# Patient Record
Sex: MALE | Race: BLACK OR AFRICAN AMERICAN | NOT HISPANIC OR LATINO
[De-identification: names, ages, dates, MRNs, and addresses within clinical notes are randomized per-mention and may not be internally consistent; named-entity substitution may affect disease eponyms.]

---

## 2018-12-05 ENCOUNTER — APPOINTMENT (OUTPATIENT)
Dept: CT IMAGING | Facility: HOSPITAL | Age: 68
End: 2018-12-05

## 2018-12-05 ENCOUNTER — OUTPATIENT (OUTPATIENT)
Dept: OUTPATIENT SERVICES | Facility: HOSPITAL | Age: 68
LOS: 1 days | Discharge: HOME | End: 2018-12-05
Payer: COMMERCIAL

## 2018-12-05 DIAGNOSIS — R19.00 INTRA-ABDOMINAL AND PELVIC SWELLING, MASS AND LUMP, UNSPECIFIED SITE: ICD-10-CM

## 2018-12-05 DIAGNOSIS — R79.89 OTHER SPECIFIED ABNORMAL FINDINGS OF BLOOD CHEMISTRY: ICD-10-CM

## 2018-12-05 LAB
ANION GAP SERPL CALC-SCNC: 8 MMOL/L — SIGNIFICANT CHANGE UP (ref 5–17)
BUN SERPL-MCNC: 10 MG/DL — SIGNIFICANT CHANGE UP (ref 7–23)
CALCIUM SERPL-MCNC: 8.7 MG/DL — SIGNIFICANT CHANGE UP (ref 8.5–10.1)
CHLORIDE SERPL-SCNC: 109 MMOL/L — HIGH (ref 96–108)
CO2 SERPL-SCNC: 27 MMOL/L — SIGNIFICANT CHANGE UP (ref 22–31)
CREAT SERPL-MCNC: 1.03 MG/DL — SIGNIFICANT CHANGE UP (ref 0.5–1.3)
GLUCOSE SERPL-MCNC: 87 MG/DL — SIGNIFICANT CHANGE UP (ref 70–99)
POTASSIUM SERPL-MCNC: 3.7 MMOL/L — SIGNIFICANT CHANGE UP (ref 3.5–5.3)
POTASSIUM SERPL-SCNC: 3.7 MMOL/L — SIGNIFICANT CHANGE UP (ref 3.5–5.3)
SODIUM SERPL-SCNC: 144 MMOL/L — SIGNIFICANT CHANGE UP (ref 135–145)

## 2018-12-05 PROCEDURE — 74177 CT ABD & PELVIS W/CONTRAST: CPT | Mod: 26

## 2018-12-19 ENCOUNTER — TRANSCRIPTION ENCOUNTER (OUTPATIENT)
Age: 68
End: 2018-12-19

## 2019-01-28 ENCOUNTER — OUTPATIENT (OUTPATIENT)
Dept: OUTPATIENT SERVICES | Facility: HOSPITAL | Age: 69
LOS: 1 days | End: 2019-01-28
Payer: COMMERCIAL

## 2019-01-28 ENCOUNTER — APPOINTMENT (OUTPATIENT)
Dept: MRI IMAGING | Facility: IMAGING CENTER | Age: 69
End: 2019-01-28

## 2019-01-28 DIAGNOSIS — Z00.8 ENCOUNTER FOR OTHER GENERAL EXAMINATION: ICD-10-CM

## 2019-01-28 PROCEDURE — 72197 MRI PELVIS W/O & W/DYE: CPT | Mod: 26

## 2019-01-28 PROCEDURE — 72197 MRI PELVIS W/O & W/DYE: CPT

## 2019-01-28 PROCEDURE — A9585: CPT

## 2019-06-18 ENCOUNTER — RECORD ABSTRACTING (OUTPATIENT)
Age: 69
End: 2019-06-18

## 2019-06-18 DIAGNOSIS — M19.90 UNSPECIFIED OSTEOARTHRITIS, UNSPECIFIED SITE: ICD-10-CM

## 2019-06-18 DIAGNOSIS — Z82.0 FAMILY HISTORY OF EPILEPSY AND OTHER DISEASES OF THE NERVOUS SYSTEM: ICD-10-CM

## 2019-06-18 DIAGNOSIS — Z78.9 OTHER SPECIFIED HEALTH STATUS: ICD-10-CM

## 2019-06-18 DIAGNOSIS — Z87.891 PERSONAL HISTORY OF NICOTINE DEPENDENCE: ICD-10-CM

## 2019-06-18 DIAGNOSIS — Z83.3 FAMILY HISTORY OF DIABETES MELLITUS: ICD-10-CM

## 2019-06-18 DIAGNOSIS — R41.3 OTHER AMNESIA: ICD-10-CM

## 2019-06-27 ENCOUNTER — APPOINTMENT (OUTPATIENT)
Dept: UROLOGY | Facility: CLINIC | Age: 69
End: 2019-06-27

## 2019-07-02 ENCOUNTER — TRANSCRIPTION ENCOUNTER (OUTPATIENT)
Age: 69
End: 2019-07-02

## 2019-07-15 ENCOUNTER — RECORD ABSTRACTING (OUTPATIENT)
Age: 69
End: 2019-07-15

## 2019-07-15 LAB — PSA SERPL-MCNC: 8.5

## 2019-07-23 ENCOUNTER — APPOINTMENT (OUTPATIENT)
Dept: UROLOGY | Facility: CLINIC | Age: 69
End: 2019-07-23
Payer: COMMERCIAL

## 2019-07-23 VITALS
DIASTOLIC BLOOD PRESSURE: 78 MMHG | BODY MASS INDEX: 31.39 KG/M2 | HEART RATE: 68 BPM | SYSTOLIC BLOOD PRESSURE: 129 MMHG | HEIGHT: 67 IN | TEMPERATURE: 98.1 F | OXYGEN SATURATION: 94 % | WEIGHT: 200 LBS | RESPIRATION RATE: 13 BRPM

## 2019-07-23 DIAGNOSIS — Z86.79 PERSONAL HISTORY OF OTHER DISEASES OF THE CIRCULATORY SYSTEM: ICD-10-CM

## 2019-07-23 DIAGNOSIS — J31.0 CHRONIC RHINITIS: ICD-10-CM

## 2019-07-23 DIAGNOSIS — Z87.898 PERSONAL HISTORY OF OTHER SPECIFIED CONDITIONS: ICD-10-CM

## 2019-07-23 PROCEDURE — 99214 OFFICE O/P EST MOD 30 MIN: CPT

## 2019-07-23 RX ORDER — LOSARTAN POTASSIUM 25 MG/1
25 TABLET, FILM COATED ORAL DAILY
Refills: 0 | Status: DISCONTINUED | COMMUNITY
End: 2019-07-23

## 2019-07-23 NOTE — HISTORY OF PRESENT ILLNESS
[FreeTextEntry1] : Pt was diagnosed with CA prostate. here for follow up. PSA is rising. Will repeat MRI

## 2019-07-23 NOTE — PHYSICAL EXAM
[General Appearance - Well Developed] : well developed [General Appearance - Well Nourished] : well nourished [Well Groomed] : well groomed [Normal Appearance] : normal appearance [General Appearance - In No Acute Distress] : no acute distress [Edema] : no peripheral edema [Respiration, Rhythm And Depth] : normal respiratory rhythm and effort [Exaggerated Use Of Accessory Muscles For Inspiration] : no accessory muscle use [Abdomen Tenderness] : non-tender [Abdomen Soft] : soft [Costovertebral Angle Tenderness] : no ~M costovertebral angle tenderness [Urethral Meatus] : meatus normal [Urinary Bladder Findings] : the bladder was normal on palpation [Scrotum] : the scrotum was normal [Testes Mass (___cm)] : there were no testicular masses [No Prostate Nodules] : no prostate nodules [Normal Station and Gait] : the gait and station were normal for the patient's age [] : no rash [No Focal Deficits] : no focal deficits [Affect] : the affect was normal [Oriented To Time, Place, And Person] : oriented to person, place, and time [Not Anxious] : not anxious [Mood] : the mood was normal [No Palpable Adenopathy] : no palpable adenopathy

## 2019-07-23 NOTE — REVIEW OF SYSTEMS
[Painful [place]] : painful [place] [Feeling Tired] : feeling tired [Heartburn] : heartburn [Poor quality erections] : Poor quality erections [Wake up at night to urinate  How many times?  ___] : wakes up to urinate [unfilled] times during the night [Strong urge to urinate] : strong urge to urinate [Strain or push to urinate] : strain or push to urinate [Hot Flashes] : hot flashes [Skin Lesions] : skin lesion [Itching] : itching [Negative] : Heme/Lymph [FreeTextEntry3] : Frequent Urination (4x)

## 2019-07-24 LAB
APPEARANCE: CLEAR
BACTERIA: NEGATIVE
BILIRUBIN URINE: NEGATIVE
BLOOD URINE: NEGATIVE
COLOR: YELLOW
GLUCOSE QUALITATIVE U: NEGATIVE
HYALINE CASTS: 0 /LPF
KETONES URINE: NEGATIVE
LEUKOCYTE ESTERASE URINE: NEGATIVE
MICROSCOPIC-UA: NORMAL
NITRITE URINE: NEGATIVE
PH URINE: 6.5
PROTEIN URINE: NORMAL
RED BLOOD CELLS URINE: 4 /HPF
SPECIFIC GRAVITY URINE: 1.02
SQUAMOUS EPITHELIAL CELLS: 0 /HPF
UROBILINOGEN URINE: NORMAL
WHITE BLOOD CELLS URINE: 2 /HPF

## 2019-07-25 LAB — BACTERIA UR CULT: NORMAL

## 2019-08-29 ENCOUNTER — OUTPATIENT (OUTPATIENT)
Dept: OUTPATIENT SERVICES | Facility: HOSPITAL | Age: 69
LOS: 1 days | End: 2019-08-29
Payer: COMMERCIAL

## 2019-08-29 ENCOUNTER — APPOINTMENT (OUTPATIENT)
Dept: MRI IMAGING | Facility: IMAGING CENTER | Age: 69
End: 2019-08-29
Payer: COMMERCIAL

## 2019-08-29 DIAGNOSIS — C61 MALIGNANT NEOPLASM OF PROSTATE: ICD-10-CM

## 2019-08-29 PROCEDURE — 72197 MRI PELVIS W/O & W/DYE: CPT | Mod: 26

## 2019-08-29 PROCEDURE — 72197 MRI PELVIS W/O & W/DYE: CPT

## 2019-08-29 PROCEDURE — A9585: CPT

## 2019-09-24 ENCOUNTER — APPOINTMENT (OUTPATIENT)
Dept: UROLOGY | Facility: CLINIC | Age: 69
End: 2019-09-24
Payer: COMMERCIAL

## 2019-09-24 PROCEDURE — 99213 OFFICE O/P EST LOW 20 MIN: CPT

## 2019-09-24 NOTE — ASSESSMENT
[FreeTextEntry1] : MRI : no obvious cancer. still blood present from the previous biopsy.\par will follow PSA

## 2019-09-24 NOTE — REVIEW OF SYSTEMS
[Negative] : Heme/Lymph [Feeling Tired] : feeling tired [Heartburn] : heartburn [Painful [place]] : painful [place] [Poor quality erections] : Poor quality erections [Wake up at night to urinate  How many times?  ___] : wakes up to urinate [unfilled] times during the night [Strong urge to urinate] : strong urge to urinate [Strain or push to urinate] : strain or push to urinate [Skin Lesions] : skin lesion [Itching] : itching [Hot Flashes] : hot flashes [FreeTextEntry3] : Frequent Urination (4x)

## 2019-09-24 NOTE — HISTORY OF PRESENT ILLNESS
[FreeTextEntry1] : Enlarged prostate with lower urinary tract symptoms (LUTS) (600.01) (N40.1)\par  · nocturia X 3, urgency. Uroflow has been good\par Adenocarcinoma of prostate (185) (C61)\par  · The patient is a 68 year old male who presents for evaluation and management of\par     prostate cancer. Date first diagnosed: (11/07/2018). The course has been constant.\par     Carlitos score: 8 (4+4= 8 in one core. 3+3= 6 in 3 cores,\par     4+3=7 in 2 coeres). Total 6/12 cores positve. Cell type: adenocarcinoma.\par     \par      PSA results: \par     10/00/2007 1.59 ng/ml\par     03/03/2008 4.310 ng/ml\par     03/07/2008 3.9 ng/ml\par     04/15/2008 3.4 ng/ml\par     05/12/2008 2.8 ng/ml\par     09/15/2008 1.4 ng/ml\par     11/11/2008 1.4 ng/ml\par     05/00/2010 1.8 ng/ml\par     05/00/2011 2.4 ng/ml\par     03/00/2012 2.8 ng/ml\par     06/00/2012 2.5 ng/ml\par     06/2013 2.5 ng/ml\par     10/2013 2.7 ng/ml\par     04/21/2017 5.3 ng/ml\par     02/16/2018 7.2 ng/ml\par     05/18/2018 7.1 ng/ml\par     09/30/2018 9.1 ng/ml\par     11/07/2018 TRUS and NBP .\par      Pathology result.\par     Adenocarcinoma of Prostate Gleasons score 4+3=7\par     \par      CT scan, 12/05/2018 @ [facility].\par     IMPRESSION:\par     1.3 cm hepatic cyst. Multiple additional subcentimeter low attenuating lesions are\par     present in the liver, too small to further characterize.\par     No adenopathy.\par     Indeterminate sclerotic right iliac bone lesion (8 mm).\par     Enlarged prostate gland.\par     \par      MRI result, on 01/28/2019 @ [facility].\par     IMPRESSION:\par     Hemorrhage from recent biopsy limits evaluation. Prostate capsule is intact. Consider\par     repeat examination in 3 months.\par     PIRADS 3 - Intermediate (the presence of clinically significant cancer is equivocal)\par     \par     03/19/2019 8.5 ng/ml\par     PSA : 07/16/2019: 10.5 ng/ml\par     \par     will repeat MRI prostate\par \par Pt was diagnosed with CA prostate. here for follow up. PSA is rising. Will repeat MRI

## 2019-09-24 NOTE — PHYSICAL EXAM
[General Appearance - Well Developed] : well developed [General Appearance - Well Nourished] : well nourished [Normal Appearance] : normal appearance [Well Groomed] : well groomed [General Appearance - In No Acute Distress] : no acute distress [Abdomen Soft] : soft [Abdomen Tenderness] : non-tender [Costovertebral Angle Tenderness] : no ~M costovertebral angle tenderness [Urethral Meatus] : meatus normal [Scrotum] : the scrotum was normal [Urinary Bladder Findings] : the bladder was normal on palpation [No Prostate Nodules] : no prostate nodules [Testes Mass (___cm)] : there were no testicular masses [Edema] : no peripheral edema [] : no respiratory distress [Respiration, Rhythm And Depth] : normal respiratory rhythm and effort [Exaggerated Use Of Accessory Muscles For Inspiration] : no accessory muscle use [Oriented To Time, Place, And Person] : oriented to person, place, and time [Affect] : the affect was normal [Mood] : the mood was normal [Not Anxious] : not anxious [Normal Station and Gait] : the gait and station were normal for the patient's age [No Focal Deficits] : no focal deficits [No Palpable Adenopathy] : no palpable adenopathy

## 2019-12-12 ENCOUNTER — APPOINTMENT (OUTPATIENT)
Dept: UROLOGY | Facility: CLINIC | Age: 69
End: 2019-12-12
Payer: COMMERCIAL

## 2019-12-12 VITALS — TEMPERATURE: 98 F | HEART RATE: 65 BPM | SYSTOLIC BLOOD PRESSURE: 132 MMHG | DIASTOLIC BLOOD PRESSURE: 77 MMHG

## 2019-12-12 PROCEDURE — 99214 OFFICE O/P EST MOD 30 MIN: CPT

## 2019-12-12 NOTE — REVIEW OF SYSTEMS
[Feeling Tired] : feeling tired [Heartburn] : heartburn [Painful [place]] : painful [place] [Poor quality erections] : Poor quality erections [Wake up at night to urinate  How many times?  ___] : wakes up to urinate [unfilled] times during the night [Strong urge to urinate] : strong urge to urinate [Skin Lesions] : skin lesion [Strain or push to urinate] : strain or push to urinate [Itching] : itching [Hot Flashes] : hot flashes [Negative] : Heme/Lymph [FreeTextEntry3] : Frequent Urination (4x)

## 2019-12-12 NOTE — PHYSICAL EXAM
[General Appearance - Well Developed] : well developed [Well Groomed] : well groomed [General Appearance - Well Nourished] : well nourished [Normal Appearance] : normal appearance [General Appearance - In No Acute Distress] : no acute distress [Abdomen Soft] : soft [Abdomen Tenderness] : non-tender [Costovertebral Angle Tenderness] : no ~M costovertebral angle tenderness [Urinary Bladder Findings] : the bladder was normal on palpation [Urethral Meatus] : meatus normal [Scrotum] : the scrotum was normal [Testes Mass (___cm)] : there were no testicular masses [No Prostate Nodules] : no prostate nodules [Edema] : no peripheral edema [] : no respiratory distress [Respiration, Rhythm And Depth] : normal respiratory rhythm and effort [Exaggerated Use Of Accessory Muscles For Inspiration] : no accessory muscle use [Oriented To Time, Place, And Person] : oriented to person, place, and time [Affect] : the affect was normal [Mood] : the mood was normal [Not Anxious] : not anxious [Normal Station and Gait] : the gait and station were normal for the patient's age [No Focal Deficits] : no focal deficits [No Palpable Adenopathy] : no palpable adenopathy

## 2019-12-12 NOTE — HISTORY OF PRESENT ILLNESS
[FreeTextEntry1] : Enlarged prostate with lower urinary tract symptoms (LUTS) (600.01) (N40.1)\par  · nocturia X 3, urgency. Uroflow has been good\par Adenocarcinoma of prostate (185) (C61)\par  · The patient is a 68 year old male who presents for evaluation and management of\par     prostate cancer. Date first diagnosed: (11/07/2018). The course has been constant.\par     Carlitos score: 8 (4+4= 8 in one core. 3+3= 6 in 3 cores,\par     4+3=7 in 2 coeres). Total 6/12 cores positve. Cell type: adenocarcinoma.\par     \par      PSA results: \par     10/00/2007 1.59 ng/ml\par     03/03/2008 4.310 ng/ml\par     03/07/2008 3.9 ng/ml\par     04/15/2008 3.4 ng/ml\par     05/12/2008 2.8 ng/ml\par     09/15/2008 1.4 ng/ml\par     11/11/2008 1.4 ng/ml\par     05/00/2010 1.8 ng/ml\par     05/00/2011 2.4 ng/ml\par     03/00/2012 2.8 ng/ml\par     06/00/2012 2.5 ng/ml\par     06/2013 2.5 ng/ml\par     10/2013 2.7 ng/ml\par     04/21/2017 5.3 ng/ml\par     02/16/2018 7.2 ng/ml\par     05/18/2018 7.1 ng/ml\par     09/30/2018 9.1 ng/ml\par     11/07/2018 TRUS and NBP .\par      Pathology result.\par     Adenocarcinoma of Prostate Gleasons score 4+3=7\par     \par      CT scan, 12/05/2018 @ [facility].\par     IMPRESSION:\par     1.3 cm hepatic cyst. Multiple additional subcentimeter low attenuating lesions are\par     present in the liver, too small to further characterize.\par     No adenopathy.\par     Indeterminate sclerotic right iliac bone lesion (8 mm).\par     Enlarged prostate gland.\par     \par      MRI result, on 01/28/2019 @ [facility].\par     IMPRESSION:\par     Hemorrhage from recent biopsy limits evaluation. Prostate capsule is intact. Consider\par     repeat examination in 3 months.\par     PIRADS 3 - Intermediate (the presence of clinically significant cancer is equivocal)\par     \par     03/19/2019 8.5 ng/ml\par     PSA : 07/16/2019: 10.5 ng/ml\par      will repeat MRI prostate\par \par Pt was diagnosed with CA prostate. here for follow up. PSA is rising. Will repeat MRI\par \par 08/29/2019: repeat MRI: blood through the peripheral zone. Prostate is intact. \par \par will follow PPSA\par \par

## 2020-03-25 ENCOUNTER — TRANSCRIPTION ENCOUNTER (OUTPATIENT)
Age: 70
End: 2020-03-25

## 2020-05-21 LAB — PSA SERPL-MCNC: 14.5 NG/ML

## 2020-06-11 ENCOUNTER — APPOINTMENT (OUTPATIENT)
Dept: UROLOGY | Facility: CLINIC | Age: 70
End: 2020-06-11
Payer: MEDICARE

## 2020-06-11 VITALS — TEMPERATURE: 98.6 F | DIASTOLIC BLOOD PRESSURE: 83 MMHG | HEART RATE: 77 BPM | SYSTOLIC BLOOD PRESSURE: 137 MMHG

## 2020-06-11 PROCEDURE — 99214 OFFICE O/P EST MOD 30 MIN: CPT

## 2020-06-11 NOTE — REVIEW OF SYSTEMS
[Feeling Tired] : feeling tired [Heartburn] : heartburn [Painful [place]] : painful [place] [Poor quality erections] : Poor quality erections [Wake up at night to urinate  How many times?  ___] : wakes up to urinate [unfilled] times during the night [Strong urge to urinate] : strong urge to urinate [Strain or push to urinate] : strain or push to urinate [Skin Lesions] : skin lesion [Itching] : itching [Hot Flashes] : hot flashes [Negative] : Heme/Lymph [FreeTextEntry3] : Frequent Urination (4x)

## 2020-06-11 NOTE — ADDENDUM
[FreeTextEntry1] : I, Elizabeth Innocent, acted solely as a scribe for Dr. Edwar Barreto on this date, 06/11/2020.\par \par All medical record entries made by the Scribe were at my Dr. Edwar Barreto direction and personally dictated by me on, 06/11/2020. I have reviewed the chart and agree that the record accurately reflects my personal performance of the history, physical exam, assessment and plan. I have also personally directed, reviewed and agreed with the chart.\par

## 2020-06-11 NOTE — PHYSICAL EXAM
[General Appearance - Well Nourished] : well nourished [General Appearance - Well Developed] : well developed [Well Groomed] : well groomed [Normal Appearance] : normal appearance [Abdomen Soft] : soft [General Appearance - In No Acute Distress] : no acute distress [Costovertebral Angle Tenderness] : no ~M costovertebral angle tenderness [Abdomen Tenderness] : non-tender [Urethral Meatus] : meatus normal [Urinary Bladder Findings] : the bladder was normal on palpation [Testes Mass (___cm)] : there were no testicular masses [Scrotum] : the scrotum was normal [No Prostate Nodules] : no prostate nodules [Edema] : no peripheral edema [] : no respiratory distress [Oriented To Time, Place, And Person] : oriented to person, place, and time [Exaggerated Use Of Accessory Muscles For Inspiration] : no accessory muscle use [Respiration, Rhythm And Depth] : normal respiratory rhythm and effort [Affect] : the affect was normal [Mood] : the mood was normal [Normal Station and Gait] : the gait and station were normal for the patient's age [No Focal Deficits] : no focal deficits [Not Anxious] : not anxious [No Palpable Adenopathy] : no palpable adenopathy [Anus Abnormality] : the anus and perineum were normal [Epididymis] : the epididymides were normal [Testes Tenderness] : no tenderness of the testes [Rectal Exam - Rectum] : digital rectal exam was normal [Prostate Enlargement] : the prostate was not enlarged [Prostate Tenderness] : the prostate was not tender [Prostate Size ___ (0-4)] : prostate size [unfilled] (scale: 0-4)

## 2020-06-11 NOTE — HISTORY OF PRESENT ILLNESS
[FreeTextEntry1] : Enlarged prostate with lower urinary tract symptoms (LUTS) (600.01) (N40.1)\par  · nocturia X 3, urgency. Uroflow has been good\par Adenocarcinoma of prostate (185) (C61)\par  · The patient is a 68 year old male who presents for evaluation and management of\par     prostate cancer. Date first diagnosed: (11/07/2018). The course has been constant.\par     Carlitos score: 8 (4+4= 8 in one core. 3+3= 6 in 3 cores,\par     4+3=7 in 2 coeres). Total 6/12 cores positve. Cell type: adenocarcinoma.\par     \par      PSA results: \par     10/00/2007 1.59 ng/ml\par     03/03/2008 4.310 ng/ml\par     03/07/2008 3.9 ng/ml\par     04/15/2008 3.4 ng/ml\par     05/12/2008 2.8 ng/ml\par     09/15/2008 1.4 ng/ml\par     11/11/2008 1.4 ng/ml\par     05/00/2010 1.8 ng/ml\par     05/00/2011 2.4 ng/ml\par     03/00/2012 2.8 ng/ml\par     06/00/2012 2.5 ng/ml\par     06/2013 2.5 ng/ml\par     10/2013 2.7 ng/ml\par     04/21/2017 5.3 ng/ml\par     02/16/2018 7.2 ng/ml\par     05/18/2018 7.1 ng/ml\par     09/30/2018 9.1 ng/ml\par     11/07/2018 TRUS and NBP .\par      Pathology result.\par     Adenocarcinoma of Prostate Gleasons score 4+3=7\par     \par      CT scan, 12/05/2018 @ [facility].\par     IMPRESSION:\par     1.3 cm hepatic cyst. Multiple additional subcentimeter low attenuating lesions are\par     present in the liver, too small to further characterize.\par     No adenopathy.\par     Indeterminate sclerotic right iliac bone lesion (8 mm).\par     Enlarged prostate gland.\par     \par      MRI result, on 01/28/2019 @ [facility].\par     IMPRESSION:\par     Hemorrhage from recent biopsy limits evaluation. Prostate capsule is intact. Consider\par     repeat examination in 3 months.\par     PIRADS 3 - Intermediate (the presence of clinically significant cancer is equivocal)\par     \par     03/19/2019 8.5 ng/ml\par     PSA : 07/16/2019: 10.5 ng/ml\par      will repeat MRI prostate\par \par Pt was diagnosed with CA prostate. here for follow up. PSA is rising. Will repeat MRI\par \par 08/29/2019: repeat MRI: blood through the peripheral zone. Prostate is intact. \par \par will follow PSA\par \par 03/26/2019 8.5 ng/ml\par 11/08/2019: 10.7 ng/ml\par 05/21/2020: 14.5 ng/ml\par \par 06/11/2020: CA prostate: MRI of prostate 08/29/2019: no change still blood in the peripheral zone. Rising PSA. Will repeat MRI and PSA.\par LUTS: Good urine flow. Denies dysuria. Doing well.

## 2020-06-12 LAB
APPEARANCE: CLEAR
BACTERIA: NEGATIVE
BILIRUBIN URINE: NEGATIVE
BLOOD URINE: NEGATIVE
COLOR: NORMAL
GLUCOSE QUALITATIVE U: NEGATIVE
HYALINE CASTS: 0 /LPF
KETONES URINE: NEGATIVE
LEUKOCYTE ESTERASE URINE: NEGATIVE
MICROSCOPIC-UA: NORMAL
NITRITE URINE: NEGATIVE
PH URINE: 6.5
PROTEIN URINE: NORMAL
RED BLOOD CELLS URINE: 1 /HPF
SPECIFIC GRAVITY URINE: 1.02
SQUAMOUS EPITHELIAL CELLS: 0 /HPF
UROBILINOGEN URINE: NORMAL
WHITE BLOOD CELLS URINE: 0 /HPF

## 2020-06-15 LAB — BACTERIA UR CULT: NORMAL

## 2020-08-17 LAB — PSA SERPL-MCNC: 14.6 NG/ML

## 2020-08-31 ENCOUNTER — TRANSCRIPTION ENCOUNTER (OUTPATIENT)
Age: 70
End: 2020-08-31

## 2020-09-15 ENCOUNTER — RESULT REVIEW (OUTPATIENT)
Age: 70
End: 2020-09-15

## 2020-09-15 ENCOUNTER — OUTPATIENT (OUTPATIENT)
Dept: OUTPATIENT SERVICES | Facility: HOSPITAL | Age: 70
LOS: 1 days | End: 2020-09-15
Payer: MEDICARE

## 2020-09-15 ENCOUNTER — APPOINTMENT (OUTPATIENT)
Dept: MRI IMAGING | Facility: IMAGING CENTER | Age: 70
End: 2020-09-15
Payer: MEDICARE

## 2020-09-15 DIAGNOSIS — C61 MALIGNANT NEOPLASM OF PROSTATE: ICD-10-CM

## 2020-09-15 PROCEDURE — 72197 MRI PELVIS W/O & W/DYE: CPT

## 2020-09-15 PROCEDURE — 76377 3D RENDER W/INTRP POSTPROCES: CPT

## 2020-09-15 PROCEDURE — 72197 MRI PELVIS W/O & W/DYE: CPT | Mod: 26

## 2020-09-15 PROCEDURE — A9585: CPT

## 2020-09-15 PROCEDURE — 76377 3D RENDER W/INTRP POSTPROCES: CPT | Mod: 26

## 2020-09-24 ENCOUNTER — APPOINTMENT (OUTPATIENT)
Dept: UROLOGY | Facility: CLINIC | Age: 70
End: 2020-09-24
Payer: MEDICARE

## 2020-09-24 VITALS — DIASTOLIC BLOOD PRESSURE: 79 MMHG | TEMPERATURE: 98.8 F | SYSTOLIC BLOOD PRESSURE: 128 MMHG | HEART RATE: 74 BPM

## 2020-09-24 PROCEDURE — 99214 OFFICE O/P EST MOD 30 MIN: CPT | Mod: 25

## 2020-09-24 PROCEDURE — 51741 ELECTRO-UROFLOWMETRY FIRST: CPT

## 2020-09-24 PROCEDURE — 51798 US URINE CAPACITY MEASURE: CPT | Mod: 59

## 2020-09-24 NOTE — REVIEW OF SYSTEMS
[Feeling Tired] : feeling tired [Heartburn] : heartburn [Painful [place]] : painful [place] [Poor quality erections] : Poor quality erections [Wake up at night to urinate  How many times?  ___] : wakes up to urinate [unfilled] times during the night [Strong urge to urinate] : strong urge to urinate [Strain or push to urinate] : strain or push to urinate [Skin Lesions] : skin lesion [Itching] : itching [Hot Flashes] : hot flashes [Negative] : Heme/Lymph [FreeTextEntry3] : Frequent Urination (4x)

## 2020-09-24 NOTE — PHYSICAL EXAM
[General Appearance - Well Developed] : well developed [General Appearance - Well Nourished] : well nourished [Normal Appearance] : normal appearance [Well Groomed] : well groomed [General Appearance - In No Acute Distress] : no acute distress [Abdomen Soft] : soft [Abdomen Tenderness] : non-tender [Costovertebral Angle Tenderness] : no ~M costovertebral angle tenderness [Urethral Meatus] : meatus normal [Urinary Bladder Findings] : the bladder was normal on palpation [Scrotum] : the scrotum was normal [Epididymis] : the epididymides were normal [Testes Tenderness] : no tenderness of the testes [Testes Mass (___cm)] : there were no testicular masses [Anus Abnormality] : the anus and perineum were normal [Rectal Exam - Rectum] : digital rectal exam was normal [Prostate Enlargement] : the prostate was not enlarged [Prostate Tenderness] : the prostate was not tender [No Prostate Nodules] : no prostate nodules [Prostate Size ___ (0-4)] : prostate size [unfilled] (scale: 0-4) [Edema] : no peripheral edema [] : no respiratory distress [Respiration, Rhythm And Depth] : normal respiratory rhythm and effort [Exaggerated Use Of Accessory Muscles For Inspiration] : no accessory muscle use [Oriented To Time, Place, And Person] : oriented to person, place, and time [Affect] : the affect was normal [Mood] : the mood was normal [Not Anxious] : not anxious [Normal Station and Gait] : the gait and station were normal for the patient's age [No Focal Deficits] : no focal deficits [No Palpable Adenopathy] : no palpable adenopathy [Penis Abnormality] : normal uncircumcised penis

## 2020-09-24 NOTE — HISTORY OF PRESENT ILLNESS
[FreeTextEntry1] : Enlarged prostate with lower urinary tract symptoms (LUTS) (600.01) (N40.1)\par  · nocturia X 3, urgency. Uroflow has been good\par Adenocarcinoma of prostate (185) (C61)\par  · The patient is a 68 year old male who presents for evaluation and management of\par     prostate cancer. Date first diagnosed: (11/07/2018). The course has been constant.\par     Carlitos score: 8 (4+4= 8 in one core. 3+3= 6 in 3 cores,\par     4+3=7 in 2 coeres). Total 6/12 cores positve. Cell type: adenocarcinoma.\par     \par      PSA results: \par     10/00/2007 1.59 ng/ml\par     03/03/2008 4.310 ng/ml\par     03/07/2008 3.9 ng/ml\par     04/15/2008 3.4 ng/ml\par     05/12/2008 2.8 ng/ml\par     09/15/2008 1.4 ng/ml\par     11/11/2008 1.4 ng/ml\par     05/00/2010 1.8 ng/ml\par     05/00/2011 2.4 ng/ml\par     03/00/2012 2.8 ng/ml\par     06/00/2012 2.5 ng/ml\par     06/2013 2.5 ng/ml\par     10/2013 2.7 ng/ml\par     04/21/2017 5.3 ng/ml\par     02/16/2018 7.2 ng/ml\par     05/18/2018 7.1 ng/ml\par     09/30/2018 9.1 ng/ml\par     11/07/2018 TRUS and NBP .\par      Pathology result.\par     Adenocarcinoma of Prostate Gleasons score 4+3=7\par     \par      CT scan, 12/05/2018 @ [facility].\par     IMPRESSION:\par     1.3 cm hepatic cyst. Multiple additional subcentimeter low attenuating lesions are\par     present in the liver, too small to further characterize.\par     No adenopathy.\par     Indeterminate sclerotic right iliac bone lesion (8 mm).\par     Enlarged prostate gland.\par     \par      MRI result, on 01/28/2019 @ [facility].\par     IMPRESSION:\par     Hemorrhage from recent biopsy limits evaluation. Prostate capsule is intact. Consider\par     repeat examination in 3 months.\par     PIRADS 3 - Intermediate (the presence of clinically significant cancer is equivocal)\par     \par     03/19/2019 8.5 ng/ml\par     PSA : 07/16/2019: 10.5 ng/ml\par      will repeat MRI prostate\par \par Pt was diagnosed with CA prostate. here for follow up. PSA is rising. Will repeat MRI\par \par 08/29/2019: repeat MRI: blood through the peripheral zone. Prostate is intact. \par \par will follow PSA\par \par 03/26/2019 8.5 ng/ml\par 11/08/2019: 10.7 ng/ml\par 05/21/2020: 14.5 ng/ml\par \par 06/11/2020: CA prostate: MRI of prostate 08/29/2019: no change still blood in the peripheral zone. Rising PSA. Will repeat MRI and PSA.\par LUTS: Good urine flow. Denies dysuria. Doing well. \par \par 09/24/2020: Pt is here today for follow up for CA prostate and LUTS.  N x 0. Daytime frequency depends on fluid intake. Denies hematuria and dysuria. Uroflow: Good stream. PVR 7 cc. \par PSA on 08/14/2020: 14.6 ng/ml\par UA and culture: negative\par MRI prostate on 09/15/2020: Impression- PIRADS 4 - High (clinically significant cancer is likely to be present) Prostate size 51 cc

## 2020-09-24 NOTE — ADDENDUM
[FreeTextEntry1] : I, Elizabeth Innocent, acted solely as a scribe for Dr. Edwar Barreto on this date, 09/24/2020.\par \par All medical record entries made by the Scribe were at my Dr. Edwar Barreto direction and personally dictated by me on, 09/24/2020. I have reviewed the chart and agree that the record accurately reflects my personal performance of the history, physical exam, assessment and plan. I have also personally directed, reviewed and agreed with the chart.\par

## 2020-09-28 LAB — PSA SERPL-MCNC: 16.7 NG/ML

## 2020-11-05 ENCOUNTER — APPOINTMENT (OUTPATIENT)
Dept: UROLOGY | Facility: CLINIC | Age: 70
End: 2020-11-05
Payer: MEDICARE

## 2020-11-05 VITALS
TEMPERATURE: 98.3 F | WEIGHT: 208 LBS | BODY MASS INDEX: 32.58 KG/M2 | DIASTOLIC BLOOD PRESSURE: 85 MMHG | HEART RATE: 71 BPM | SYSTOLIC BLOOD PRESSURE: 150 MMHG

## 2020-11-05 DIAGNOSIS — N40.1 BENIGN PROSTATIC HYPERPLASIA WITH LOWER URINARY TRACT SYMPMS: ICD-10-CM

## 2020-11-05 DIAGNOSIS — N52.9 MALE ERECTILE DYSFUNCTION, UNSPECIFIED: ICD-10-CM

## 2020-11-05 DIAGNOSIS — I10 ESSENTIAL (PRIMARY) HYPERTENSION: ICD-10-CM

## 2020-11-05 PROCEDURE — 99214 OFFICE O/P EST MOD 30 MIN: CPT

## 2020-11-05 NOTE — REVIEW OF SYSTEMS
[Feeling Tired] : feeling tired [Heartburn] : heartburn [Painful [place]] : painful [place] [Poor quality erections] : Poor quality erections [Wake up at night to urinate  How many times?  ___] : wakes up to urinate [unfilled] times during the night [Strong urge to urinate] : strong urge to urinate [Strain or push to urinate] : strain or push to urinate [Skin Lesions] : skin lesion [Itching] : itching [Hot Flashes] : hot flashes [Negative] : Heme/Lymph [FreeTextEntry3] : Frequent Urination (4x)

## 2020-11-05 NOTE — PHYSICAL EXAM
[General Appearance - Well Developed] : well developed [General Appearance - Well Nourished] : well nourished [Normal Appearance] : normal appearance [Well Groomed] : well groomed [General Appearance - In No Acute Distress] : no acute distress [Abdomen Soft] : soft [Abdomen Tenderness] : non-tender [Costovertebral Angle Tenderness] : no ~M costovertebral angle tenderness [Urethral Meatus] : meatus normal [Penis Abnormality] : normal uncircumcised penis [Urinary Bladder Findings] : the bladder was normal on palpation [Scrotum] : the scrotum was normal [Epididymis] : the epididymides were normal [Testes Tenderness] : no tenderness of the testes [Testes Mass (___cm)] : there were no testicular masses [Edema] : no peripheral edema [] : no respiratory distress [Respiration, Rhythm And Depth] : normal respiratory rhythm and effort [Exaggerated Use Of Accessory Muscles For Inspiration] : no accessory muscle use [Oriented To Time, Place, And Person] : oriented to person, place, and time [Affect] : the affect was normal [Mood] : the mood was normal [Not Anxious] : not anxious [Normal Station and Gait] : the gait and station were normal for the patient's age [No Focal Deficits] : no focal deficits [No Palpable Adenopathy] : no palpable adenopathy [FreeTextEntry1] : JUNITO: deferred

## 2020-11-05 NOTE — HISTORY OF PRESENT ILLNESS
[FreeTextEntry1] : Enlarged prostate with lower urinary tract symptoms (LUTS) (600.01) (N40.1)\par  · nocturia X 3, urgency. Uroflow has been good\par Adenocarcinoma of prostate (185) (C61)\par  · The patient is a 68 year old male who presents for evaluation and management of\par     prostate cancer. Date first diagnosed: (11/07/2018). The course has been constant.\par     Carlitos score: 8 (4+4= 8 in one core. 3+3= 6 in 3 cores,\par     4+3=7 in 2 cores). Total 6/12 cores positive. Cell type: adenocarcinoma.\par     \par      PSA results: \par     10/00/2007 1.59 ng/ml\par     03/03/2008 4.310 ng/ml\par     03/07/2008 3.9 ng/ml\par     04/15/2008 3.4 ng/ml\par     05/12/2008 2.8 ng/ml\par     09/15/2008 1.4 ng/ml\par     11/11/2008 1.4 ng/ml\par     05/00/2010 1.8 ng/ml\par     05/00/2011 2.4 ng/ml\par     03/00/2012 2.8 ng/ml\par     06/00/2012 2.5 ng/ml\par     06/2013 2.5 ng/ml\par     10/2013 2.7 ng/ml\par     04/21/2017 5.3 ng/ml\par     02/16/2018 7.2 ng/ml\par     05/18/2018 7.1 ng/ml\par     09/30/2018 9.1 ng/ml\par     11/07/2018 TRUS and NBP .\par      Pathology result.\par     Adenocarcinoma of Prostate [place] score 4+3=7\par     \par      CT scan, 12/05/2018 @ [facility].\par     IMPRESSION:\par     1.3 cm hepatic cyst. Multiple additional subcentimeter low attenuating lesions are\par     present in the liver, too small to further characterize.\par     No adenopathy.\par     Indeterminate sclerotic right iliac bone lesion (8 mm).\par     Enlarged prostate gland.\par     \par      MRI result, on 01/28/2019 @ [facility].\par     IMPRESSION:\par     Hemorrhage from recent biopsy limits evaluation. Prostate capsule is intact. Consider\par     repeat examination in 3 months.\par     PIRADS 3 - Intermediate (the presence of clinically significant cancer is equivocal)\par     \par     03/19/2019 8.5 ng/ml\par     PSA : 07/16/2019: 10.5 ng/ml\par      will repeat MRI prostate\par \par Pt was diagnosed with CA prostate. here for follow up. PSA is rising. Will repeat MRI\par \par 08/29/2019: repeat MRI: blood through the peripheral zone. Prostate is intact. \par \par will follow PSA\par \par 03/26/2019 8.5 ng/ml\par 11/08/2019: 10.7 ng/ml\par 05/21/2020: 14.5 ng/ml\par \par 06/11/2020: CA prostate: MRI of prostate 08/29/2019: no change still blood in the peripheral zone. Rising PSA. Will repeat MRI and PSA.\par LUTS: Good urine flow. Denies dysuria. Doing well. \par \par 09/24/2020: Pt is here today for follow up for CA prostate and LUTS.  N x 0. Daytime frequency depends on fluid intake. Denies hematuria and dysuria. Uroflow: Good stream. PVR 7 cc. \par PSA on 08/14/2020: 14.6 ng/ml\par UA and culture: negative\par MRI prostate on 09/15/2020: Impression- PIRADS 4 - High (clinically significant cancer is likely to be present) Prostate size 51 cc\par \par 11/05/2020: Pt is here today for follow up for CA prostate. PSA on 09/25/2020: 16.7 ng/ml; rising.\par Will repeat PSA today.\par Treatment options like radiation (IMRT and CyberKnife) and prostatectomy and side effects were discussed with pt. Patient declined surgery. \par Will refer radio oncologist Dr. Chetan Ramirez to discuss radio therapy options. \par Will get UA and culture and PSA\par Follow up in 3 months

## 2020-11-05 NOTE — ADDENDUM
[FreeTextEntry1] : I, Elizabeth Innocent, acted solely as a scribe for Dr. Edwar Barreto on this date, 11/05/2020.\par \par All medical record entries made by the Scribe were at my Dr. Edwar Barreto direction and personally dictated by me on, 11/05/2020. I have reviewed the chart and agree that the record accurately reflects my personal performance of the history, physical exam, assessment and plan. I have also personally directed, reviewed and agreed with the chart.\par

## 2020-11-09 LAB
APPEARANCE: CLEAR
BACTERIA UR CULT: NORMAL
BACTERIA: NEGATIVE
BILIRUBIN URINE: NEGATIVE
BLOOD URINE: NEGATIVE
CALCIUM OXALATE CRYSTALS: ABNORMAL
COLOR: NORMAL
GLUCOSE QUALITATIVE U: NEGATIVE
KETONES URINE: NORMAL
LEUKOCYTE ESTERASE URINE: NEGATIVE
MICROSCOPIC-UA: NORMAL
NITRITE URINE: NEGATIVE
PH URINE: 7
PROTEIN URINE: NORMAL
PSA SERPL-MCNC: 17.4 NG/ML
RED BLOOD CELLS URINE: 0 /HPF
SPECIFIC GRAVITY URINE: 1.02
SQUAMOUS EPITHELIAL CELLS: 0 /HPF
UROBILINOGEN URINE: NORMAL
WHITE BLOOD CELLS URINE: 1 /HPF

## 2020-11-17 ENCOUNTER — APPOINTMENT (OUTPATIENT)
Dept: RADIATION ONCOLOGY | Facility: CLINIC | Age: 70
End: 2020-11-17
Payer: MEDICARE

## 2020-11-17 VITALS
HEART RATE: 70 BPM | OXYGEN SATURATION: 95 % | RESPIRATION RATE: 14 BRPM | SYSTOLIC BLOOD PRESSURE: 148 MMHG | DIASTOLIC BLOOD PRESSURE: 85 MMHG

## 2020-11-17 DIAGNOSIS — C61 MALIGNANT NEOPLASM OF PROSTATE: ICD-10-CM

## 2020-11-17 PROCEDURE — 99205 OFFICE O/P NEW HI 60 MIN: CPT | Mod: GC

## 2020-11-18 PROBLEM — C61 ADENOCARCINOMA OF PROSTATE: Status: ACTIVE | Noted: 2019-06-18

## 2020-11-23 NOTE — VITALS
[Maximal Pain Intensity: 0/10] : 0/10 [5 - Distress Level] : Distress Level: 5 [90: Able to carry normal activity; minor signs or symptoms of disease.] : 90: Able to carry normal activity; minor signs or symptoms of disease.

## 2020-11-24 NOTE — DISEASE MANAGEMENT
[1] : T1 [c] : c [0] : M0 [10-20] : 10 - 20 ng/mL [8] : Casper Score 8 [4] : 4 [IIIA] : IIIA [] : Patient had no Bone Scan performed [BiopsyDate] : 11/18 [MeasuredProstateVolume] : 51 [TotalCores] : 12 [TotalPositiveCores] : 6

## 2020-11-24 NOTE — LETTER CLOSING
[Consult Closing:] : Thank you for allowing me to participate in the care of this patient.  If you have any questions, please do not hesitate to contact me. [Sincerely yours,] : Sincerely yours, [FreeTextEntry3] : Samantha Rico MD\par

## 2020-11-24 NOTE — REVIEW OF SYSTEMS
[Fatigue] : fatigue [Negative] : Allergic/Immunologic [IPSS Score (0-40): ___] : IPSS score: [unfilled] [EPIC-CP Score (0-60): ___] : EPIC-CP score: [unfilled] [Fever] : no fever [Recent Change In Weight] : ~T no recent weight change [Abdominal Pain] : no abdominal pain [Vomiting] : no vomiting [FreeTextEntry4] : hx sinus problems [FreeTextEntry5] : intermittent bilateral leg swelling [FreeTextEntry7] : heartburn

## 2020-11-24 NOTE — HISTORY OF PRESENT ILLNESS
[FreeTextEntry1] : Mr. Vazquez is a 70 year old man who has been having his PSA checked for many years. According to records, his PSA since 2007 was below 4 except for a transient rise in March 2008. Then in 2017, the PSA no above 4, to 5.3 ng/mL. In 2018, the PSA increased to 9.1 ng/mL. \par \par On 11/20/18, he underwent a prostate biopsy showing Jackson 4+4=8 adenocarcinoma in one core from the right mid lateral lobe involving 30% of the tissue, GS 4+3 disease in two cores from the right medial base and left medial base involving 70% and 90% of the cores respectively, and GS 3+3 disease in three cores from the right middle lobe, right lateral apex and left lateral apex. \par \par He had a CT on 12/5/18 showing an indeterminate right iliac bone lesion measuring 8mm. He had an MRI on 2/5/19 showing post-biopsy hemorrhage, intact capsule and recommendation for repeat examination. \par \par He underwent another prostate MRI on 9/17/20 showing a 51cc prostate and two lesions. The first lesion was located in the left posterior medial midgland peripheral zone, measuring [id number] with no extraprostatic extension. The second lesion was located in the right posterior lateral midland peripheral zone measuring 11x6x8 mm without extracapsular extension. There was no seminal vesicle involvement. there were no suspicious pelvic lymph nodes or suspicious osseous lesions. \par \par The PSA was 17.4 on 11/5/20. \par \par Interval history:  He continues to have occasional urinary urgency, without hematuria or dysuria.  He does not have urge to urinate at night, but generally has trouble sleeping as a baseline. Reports his is sexually active but with difficulty sustaining erection.

## 2020-11-24 NOTE — DATA REVIEWED
[FreeTextEntry1] : PSA (ng/mL)\par \par 03/26/19  8.5 \par 11/08/19 10.7 \par 05/21/20 14.5 \par 08/14/20 14.60\par 09/25/20 16.70\par 11/06/20 17.40\par

## 2020-11-25 ENCOUNTER — RESULT REVIEW (OUTPATIENT)
Age: 70
End: 2020-11-25

## 2021-01-08 ENCOUNTER — EMERGENCY (EMERGENCY)
Facility: HOSPITAL | Age: 71
LOS: 0 days | Discharge: HOME | End: 2021-01-08
Attending: EMERGENCY MEDICINE
Payer: MEDICARE

## 2021-01-08 VITALS
TEMPERATURE: 98 F | HEART RATE: 66 BPM | RESPIRATION RATE: 20 BRPM | DIASTOLIC BLOOD PRESSURE: 83 MMHG | SYSTOLIC BLOOD PRESSURE: 126 MMHG | OXYGEN SATURATION: 98 %

## 2021-01-08 VITALS
SYSTOLIC BLOOD PRESSURE: 150 MMHG | DIASTOLIC BLOOD PRESSURE: 106 MMHG | RESPIRATION RATE: 20 BRPM | HEIGHT: 67 IN | OXYGEN SATURATION: 99 % | WEIGHT: 207.01 LBS | HEART RATE: 78 BPM | TEMPERATURE: 98 F

## 2021-01-08 DIAGNOSIS — R42 DIZZINESS AND GIDDINESS: ICD-10-CM

## 2021-01-08 DIAGNOSIS — I10 ESSENTIAL (PRIMARY) HYPERTENSION: ICD-10-CM

## 2021-01-08 DIAGNOSIS — Z87.891 PERSONAL HISTORY OF NICOTINE DEPENDENCE: ICD-10-CM

## 2021-01-08 LAB
ALBUMIN SERPL ELPH-MCNC: 3.8 G/DL — SIGNIFICANT CHANGE UP (ref 3.3–5)
ALP SERPL-CCNC: 78 U/L — SIGNIFICANT CHANGE UP (ref 40–120)
ALT FLD-CCNC: 25 U/L — SIGNIFICANT CHANGE UP (ref 12–78)
ANION GAP SERPL CALC-SCNC: 6 MMOL/L — SIGNIFICANT CHANGE UP (ref 5–17)
AST SERPL-CCNC: 23 U/L — SIGNIFICANT CHANGE UP (ref 15–37)
BASOPHILS # BLD AUTO: 0.04 K/UL — SIGNIFICANT CHANGE UP (ref 0–0.2)
BASOPHILS NFR BLD AUTO: 0.5 % — SIGNIFICANT CHANGE UP (ref 0–2)
BILIRUB SERPL-MCNC: 0.4 MG/DL — SIGNIFICANT CHANGE UP (ref 0.2–1.2)
BUN SERPL-MCNC: 16 MG/DL — SIGNIFICANT CHANGE UP (ref 7–23)
CALCIUM SERPL-MCNC: 8.7 MG/DL — SIGNIFICANT CHANGE UP (ref 8.5–10.1)
CHLORIDE SERPL-SCNC: 105 MMOL/L — SIGNIFICANT CHANGE UP (ref 96–108)
CO2 SERPL-SCNC: 29 MMOL/L — SIGNIFICANT CHANGE UP (ref 22–31)
CREAT SERPL-MCNC: 1.1 MG/DL — SIGNIFICANT CHANGE UP (ref 0.5–1.3)
EOSINOPHIL # BLD AUTO: 0.02 K/UL — SIGNIFICANT CHANGE UP (ref 0–0.5)
EOSINOPHIL NFR BLD AUTO: 0.3 % — SIGNIFICANT CHANGE UP (ref 0–6)
GLUCOSE SERPL-MCNC: 131 MG/DL — HIGH (ref 70–99)
HCT VFR BLD CALC: 44.8 % — SIGNIFICANT CHANGE UP (ref 39–50)
HGB BLD-MCNC: 14.3 G/DL — SIGNIFICANT CHANGE UP (ref 13–17)
IMM GRANULOCYTES NFR BLD AUTO: 0.1 % — SIGNIFICANT CHANGE UP (ref 0–1.5)
LYMPHOCYTES # BLD AUTO: 3.52 K/UL — HIGH (ref 1–3.3)
LYMPHOCYTES # BLD AUTO: 44.9 % — HIGH (ref 13–44)
MCHC RBC-ENTMCNC: 27.1 PG — SIGNIFICANT CHANGE UP (ref 27–34)
MCHC RBC-ENTMCNC: 31.9 GM/DL — LOW (ref 32–36)
MCV RBC AUTO: 85 FL — SIGNIFICANT CHANGE UP (ref 80–100)
MONOCYTES # BLD AUTO: 0.4 K/UL — SIGNIFICANT CHANGE UP (ref 0–0.9)
MONOCYTES NFR BLD AUTO: 5.1 % — SIGNIFICANT CHANGE UP (ref 2–14)
NEUTROPHILS # BLD AUTO: 3.85 K/UL — SIGNIFICANT CHANGE UP (ref 1.8–7.4)
NEUTROPHILS NFR BLD AUTO: 49.1 % — SIGNIFICANT CHANGE UP (ref 43–77)
NRBC # BLD: 0 /100 WBCS — SIGNIFICANT CHANGE UP (ref 0–0)
PLATELET # BLD AUTO: 207 K/UL — SIGNIFICANT CHANGE UP (ref 150–400)
POTASSIUM SERPL-MCNC: 3.3 MMOL/L — LOW (ref 3.5–5.3)
POTASSIUM SERPL-SCNC: 3.3 MMOL/L — LOW (ref 3.5–5.3)
PROT SERPL-MCNC: 7.1 GM/DL — SIGNIFICANT CHANGE UP (ref 6–8.3)
RBC # BLD: 5.27 M/UL — SIGNIFICANT CHANGE UP (ref 4.2–5.8)
RBC # FLD: 15.4 % — HIGH (ref 10.3–14.5)
SODIUM SERPL-SCNC: 140 MMOL/L — SIGNIFICANT CHANGE UP (ref 135–145)
TROPONIN I SERPL-MCNC: <.015 NG/ML — SIGNIFICANT CHANGE UP (ref 0.01–0.04)
WBC # BLD: 7.84 K/UL — SIGNIFICANT CHANGE UP (ref 3.8–10.5)
WBC # FLD AUTO: 7.84 K/UL — SIGNIFICANT CHANGE UP (ref 3.8–10.5)

## 2021-01-08 PROCEDURE — 93010 ELECTROCARDIOGRAM REPORT: CPT

## 2021-01-08 PROCEDURE — 70450 CT HEAD/BRAIN W/O DYE: CPT | Mod: 26,MG

## 2021-01-08 PROCEDURE — G1004: CPT

## 2021-01-08 PROCEDURE — 99285 EMERGENCY DEPT VISIT HI MDM: CPT

## 2021-01-08 NOTE — ED PROVIDER NOTE - NS ED ROS FT
ROS:  GEN: (-) fevers/chills, (-) weight loss, (-) fatigue/malaise  HEENT: (-) visual change, (-) photophobia, (-) nasal congestion/rhinorrhea, (-) difficulty swallowing  NECK: (-) stiffness, (-) swelling  RESP: (-) shortness of breath, (-) cough, (-) sputum, (-) hemoptysis, (-) TUBBS  CV: (-) chest pain, (-) palpitations, (-) PND/orthopnea  GI: (+) nausea, (+) vomiting, (-) pain, (-) constipation, (-) diarrhea, (-) melena, (-) BRBPR  : (-) frequency/urgency, (-) hematuria, (-) dysuria, (-) incontinence, (-) discharge  EXT: (-) edema, (-) cyanosis  ENDO: (-) heat/cold intolerance, (-) polyuria  NEURO: (-) paresthesias, (-) weakness, (-) headache, (+) dizziness, (-) syncope  MSK: no recent trauma, no muscle pain   SKIN: (-) rash

## 2021-01-08 NOTE — ED ADULT NURSE NOTE - OBJECTIVE STATEMENT
pt complain of dizziness started at 1930H today. pt states he was about to loose balance. pt vomited once on the way to ED.pt states he still feels nausea. denies chest pain. sob.

## 2021-01-08 NOTE — ED PROVIDER NOTE - PROGRESS NOTE DETAILS
Patient reports feeling better, wants to go home, ambulating without difficulty, required no medication. Continues to be neurologically intact. CT degraded by motion but no acute hemorrhage or bleed. Patient re-evaluated. Labs and imaging reviewed. Strict return precautions given.  All labs and imaging results (if any), were reviewed with the patient. Patient understands to follow up with their regular doctor. Patient understands to return to the ED is symptoms worsen or progress. Discharge instructions were given to the patient and discussed with patient. Rx were electronically sent to patient's preferred pharmacy. Patient re-evaluated. Labs and imaging reviewed. Patient reports feeling better, wants to go home, ambulating without difficulty, required no medication. Continues to be neurologically intact. CT degraded by motion but no acute hemorrhage or bleed.   All labs and imaging results were reviewed with the patient. Patient understands to follow up with their regular doctor. Patient understands to return to the ED is symptoms worsen or progress. Discharge instructions were given to the patient and discussed with patient. Strict return precautions given. Rx were electronically sent to patient's preferred pharmacy.

## 2021-01-08 NOTE — ED PROVIDER NOTE - PHYSICAL EXAMINATION
PHYSICAL EXAMINATION:  VITALS REVIEWED.  VS improved 120/96, HR 74, otherwise normal  GENERALIZED APPEARANCE:  Comfortable, no acute distress, ambulating without difficulty  SKIN:  Warm, dry, no cyanosis  HEAD:  No obvious scalp lesions  EYES:  Conjunctiva pink, no icterus  ENMT:  Mucus membranes moist, no stridor  NECK:  Supple, non-tender  CHEST AND RESPIRATORY:  Clear to auscultation B/L, good air entry B/L, equal chest expansion  HEART AND CARDIOVASCULAR:  Regular rate, no obvious murmur  ABDOMEN AND GI:  Soft, non-tender, non-distended.  No rebound, no guarding, no CVA-area tenderness  EXTREMITIES:  No deformity, edema, or calf tenderness  NEURO: AAOx3, gross motor and sensory intact, CN2-12 intact, cerebellar exam (finger to nose/YOSVANY WNL), no limb ataxia, no drift  PSYCH: Normal affect

## 2021-01-08 NOTE — ED PROVIDER NOTE - OBJECTIVE STATEMENT
70M w/ PMHx of HTN (on Losartan 25mg Qdaily) presenting to the ED with dizziness, states that he was working out at 7PM, went home and after he got home he noted that he started feeling dizzy, room spinning, states that he has not had this before, exacerbated when he got up and felt unsteady when he walked, alleviated when he lied down. States that he had 1 episode of NBNB vomiting while on the way here. Denies any symptoms right now.

## 2021-01-08 NOTE — ED PROVIDER NOTE - CLINICAL SUMMARY MEDICAL DECISION MAKING FREE TEXT BOX
70M w/ HTN with dizziness, now resolved, with 1 episode of NBNB vomiting; ?r/o CVA, r/o bleed (normal neurological exam, less likely), r/o electrolyte abnormalities; will do labs, imaging, monitoring, re-eval

## 2021-02-04 ENCOUNTER — APPOINTMENT (OUTPATIENT)
Dept: UROLOGY | Facility: CLINIC | Age: 71
End: 2021-02-04

## 2021-06-22 NOTE — ED ADULT TRIAGE NOTE - HEART RATE (BEATS/MIN)
FOLLOW UP WITH DR. MORA in 8 weeks (mid-late August 2021) in person  LABS DUE: 1 week before next visit   78

## 2021-08-13 ENCOUNTER — TRANSCRIPTION ENCOUNTER (OUTPATIENT)
Age: 71
End: 2021-08-13

## 2021-10-15 ENCOUNTER — TRANSCRIPTION ENCOUNTER (OUTPATIENT)
Age: 71
End: 2021-10-15

## 2021-11-03 ENCOUNTER — EMERGENCY (EMERGENCY)
Facility: HOSPITAL | Age: 71
LOS: 1 days | Discharge: HOME | End: 2021-11-03
Attending: EMERGENCY MEDICINE | Admitting: EMERGENCY MEDICINE
Payer: MEDICARE

## 2021-11-03 VITALS
OXYGEN SATURATION: 99 % | DIASTOLIC BLOOD PRESSURE: 99 MMHG | HEART RATE: 88 BPM | RESPIRATION RATE: 18 BRPM | TEMPERATURE: 98 F | SYSTOLIC BLOOD PRESSURE: 144 MMHG

## 2021-11-03 VITALS
HEART RATE: 84 BPM | TEMPERATURE: 98 F | DIASTOLIC BLOOD PRESSURE: 83 MMHG | OXYGEN SATURATION: 99 % | SYSTOLIC BLOOD PRESSURE: 139 MMHG | RESPIRATION RATE: 18 BRPM

## 2021-11-03 LAB
ALBUMIN SERPL ELPH-MCNC: 4.4 G/DL — SIGNIFICANT CHANGE UP (ref 3.3–5)
ALP SERPL-CCNC: 91 U/L — SIGNIFICANT CHANGE UP (ref 40–120)
ALT FLD-CCNC: 16 U/L — SIGNIFICANT CHANGE UP (ref 4–41)
ANION GAP SERPL CALC-SCNC: 13 MMOL/L — SIGNIFICANT CHANGE UP (ref 7–14)
APPEARANCE UR: CLEAR — SIGNIFICANT CHANGE UP
AST SERPL-CCNC: 25 U/L — SIGNIFICANT CHANGE UP (ref 4–40)
BACTERIA # UR AUTO: NEGATIVE — SIGNIFICANT CHANGE UP
BASOPHILS # BLD AUTO: 0.03 K/UL — SIGNIFICANT CHANGE UP (ref 0–0.2)
BASOPHILS NFR BLD AUTO: 0.4 % — SIGNIFICANT CHANGE UP (ref 0–2)
BILIRUB SERPL-MCNC: 0.2 MG/DL — SIGNIFICANT CHANGE UP (ref 0.2–1.2)
BILIRUB UR-MCNC: NEGATIVE — SIGNIFICANT CHANGE UP
BUN SERPL-MCNC: 14 MG/DL — SIGNIFICANT CHANGE UP (ref 7–23)
CALCIUM SERPL-MCNC: 9.2 MG/DL — SIGNIFICANT CHANGE UP (ref 8.4–10.5)
CHLORIDE SERPL-SCNC: 103 MMOL/L — SIGNIFICANT CHANGE UP (ref 98–107)
CO2 SERPL-SCNC: 23 MMOL/L — SIGNIFICANT CHANGE UP (ref 22–31)
COLOR SPEC: SIGNIFICANT CHANGE UP
CREAT SERPL-MCNC: 0.99 MG/DL — SIGNIFICANT CHANGE UP (ref 0.5–1.3)
DIFF PNL FLD: ABNORMAL
EOSINOPHIL # BLD AUTO: 0.05 K/UL — SIGNIFICANT CHANGE UP (ref 0–0.5)
EOSINOPHIL NFR BLD AUTO: 0.7 % — SIGNIFICANT CHANGE UP (ref 0–6)
EPI CELLS # UR: 0 /HPF — SIGNIFICANT CHANGE UP (ref 0–5)
GLUCOSE SERPL-MCNC: 174 MG/DL — HIGH (ref 70–99)
GLUCOSE UR QL: NEGATIVE — SIGNIFICANT CHANGE UP
HCT VFR BLD CALC: 35.3 % — LOW (ref 39–50)
HGB BLD-MCNC: 11.5 G/DL — LOW (ref 13–17)
IANC: 4.66 K/UL — SIGNIFICANT CHANGE UP (ref 1.5–8.5)
IMM GRANULOCYTES NFR BLD AUTO: 0.6 % — SIGNIFICANT CHANGE UP (ref 0–1.5)
KETONES UR-MCNC: NEGATIVE — SIGNIFICANT CHANGE UP
LEUKOCYTE ESTERASE UR-ACNC: NEGATIVE — SIGNIFICANT CHANGE UP
LYMPHOCYTES # BLD AUTO: 1.57 K/UL — SIGNIFICANT CHANGE UP (ref 1–3.3)
LYMPHOCYTES # BLD AUTO: 23 % — SIGNIFICANT CHANGE UP (ref 13–44)
MCHC RBC-ENTMCNC: 27.5 PG — SIGNIFICANT CHANGE UP (ref 27–34)
MCHC RBC-ENTMCNC: 32.6 GM/DL — SIGNIFICANT CHANGE UP (ref 32–36)
MCV RBC AUTO: 84.4 FL — SIGNIFICANT CHANGE UP (ref 80–100)
MONOCYTES # BLD AUTO: 0.49 K/UL — SIGNIFICANT CHANGE UP (ref 0–0.9)
MONOCYTES NFR BLD AUTO: 7.2 % — SIGNIFICANT CHANGE UP (ref 2–14)
NEUTROPHILS # BLD AUTO: 4.66 K/UL — SIGNIFICANT CHANGE UP (ref 1.8–7.4)
NEUTROPHILS NFR BLD AUTO: 68.1 % — SIGNIFICANT CHANGE UP (ref 43–77)
NITRITE UR-MCNC: NEGATIVE — SIGNIFICANT CHANGE UP
NRBC # BLD: 0 /100 WBCS — SIGNIFICANT CHANGE UP
NRBC # FLD: 0 K/UL — SIGNIFICANT CHANGE UP
PH UR: 7 — SIGNIFICANT CHANGE UP (ref 5–8)
PLATELET # BLD AUTO: 211 K/UL — SIGNIFICANT CHANGE UP (ref 150–400)
POTASSIUM SERPL-MCNC: 3.4 MMOL/L — LOW (ref 3.5–5.3)
POTASSIUM SERPL-SCNC: 3.4 MMOL/L — LOW (ref 3.5–5.3)
PROT SERPL-MCNC: 7.1 G/DL — SIGNIFICANT CHANGE UP (ref 6–8.3)
PROT UR-MCNC: NEGATIVE — SIGNIFICANT CHANGE UP
RBC # BLD: 4.18 M/UL — LOW (ref 4.2–5.8)
RBC # FLD: 15.7 % — HIGH (ref 10.3–14.5)
RBC CASTS # UR COMP ASSIST: 18 /HPF — HIGH (ref 0–4)
SODIUM SERPL-SCNC: 139 MMOL/L — SIGNIFICANT CHANGE UP (ref 135–145)
SP GR SPEC: 1.02 — SIGNIFICANT CHANGE UP (ref 1–1.05)
UROBILINOGEN FLD QL: SIGNIFICANT CHANGE UP
WBC # BLD: 6.84 K/UL — SIGNIFICANT CHANGE UP (ref 3.8–10.5)
WBC # FLD AUTO: 6.84 K/UL — SIGNIFICANT CHANGE UP (ref 3.8–10.5)
WBC UR QL: 0 /HPF — SIGNIFICANT CHANGE UP (ref 0–5)

## 2021-11-03 PROCEDURE — 99285 EMERGENCY DEPT VISIT HI MDM: CPT | Mod: GC

## 2021-11-03 PROCEDURE — 74176 CT ABD & PELVIS W/O CONTRAST: CPT | Mod: 26,MA

## 2021-11-03 RX ORDER — IBUPROFEN 200 MG
1 TABLET ORAL
Qty: 30 | Refills: 0
Start: 2021-11-03

## 2021-11-03 RX ORDER — MORPHINE SULFATE 50 MG/1
4 CAPSULE, EXTENDED RELEASE ORAL ONCE
Refills: 0 | Status: DISCONTINUED | OUTPATIENT
Start: 2021-11-03 | End: 2021-11-03

## 2021-11-03 RX ORDER — KETOROLAC TROMETHAMINE 30 MG/ML
15 SYRINGE (ML) INJECTION ONCE
Refills: 0 | Status: DISCONTINUED | OUTPATIENT
Start: 2021-11-03 | End: 2021-11-03

## 2021-11-03 RX ORDER — SODIUM CHLORIDE 9 MG/ML
1000 INJECTION INTRAMUSCULAR; INTRAVENOUS; SUBCUTANEOUS ONCE
Refills: 0 | Status: COMPLETED | OUTPATIENT
Start: 2021-11-03 | End: 2021-11-03

## 2021-11-03 RX ORDER — TAMSULOSIN HYDROCHLORIDE 0.4 MG/1
0.4 CAPSULE ORAL ONCE
Refills: 0 | Status: DISCONTINUED | OUTPATIENT
Start: 2021-11-03 | End: 2021-11-03

## 2021-11-03 RX ORDER — ONDANSETRON 8 MG/1
4 TABLET, FILM COATED ORAL ONCE
Refills: 0 | Status: COMPLETED | OUTPATIENT
Start: 2021-11-03 | End: 2021-11-03

## 2021-11-03 RX ADMIN — Medication 15 MILLIGRAM(S): at 08:00

## 2021-11-03 RX ADMIN — SODIUM CHLORIDE 1000 MILLILITER(S): 9 INJECTION INTRAMUSCULAR; INTRAVENOUS; SUBCUTANEOUS at 06:19

## 2021-11-03 RX ADMIN — Medication 15 MILLIGRAM(S): at 07:00

## 2021-11-03 RX ADMIN — ONDANSETRON 4 MILLIGRAM(S): 8 TABLET, FILM COATED ORAL at 06:20

## 2021-11-03 RX ADMIN — MORPHINE SULFATE 4 MILLIGRAM(S): 50 CAPSULE, EXTENDED RELEASE ORAL at 07:19

## 2021-11-03 RX ADMIN — MORPHINE SULFATE 4 MILLIGRAM(S): 50 CAPSULE, EXTENDED RELEASE ORAL at 07:33

## 2021-11-03 RX ADMIN — Medication 15 MILLIGRAM(S): at 06:19

## 2021-11-03 NOTE — ED PROVIDER NOTE - NSFOLLOWUPINSTRUCTIONS_ED_ALL_ED_FT
You came to the ER with flank pain. A CT scan was performed that showed a 2mm kidney stone. Take pain medication, such as ibuprofen, 400mg every 6hrs as needed for pain. Please follow up with a urologist.    Kidney stones (urolithiasis) are deposits that form inside your kidneys. The intense pain is caused by the stone moving through the urinary tract. When the stone moves, the ureter goes into spasm around the stone. The stone is usually passed in the urine. Symptoms include abdominal, side, or back pain, nausea, vomiting, blood in the urine, frequency with urination. Drink enough water and fluids to keep your urine clear or pale yellow. This will help you to pass the stone or stone fragments.    SEEK IMMEDIATE MEDICAL CARE IF YOU HAVE THE FOLLOWING SYMPTOMS: pain not controlled with medication, fever/chills, worsening vomiting, inability to urinate, or dizziness/lightheadedness.

## 2021-11-03 NOTE — ED PROVIDER NOTE - CLINICAL SUMMARY MEDICAL DECISION MAKING FREE TEXT BOX
71M p/w left flank pain that woke him up from sleep at 3AM today a/w n/v.  Denies f/c, urinary sxs.  +FHx of kidney stones in sister.  VSS.  Kidney stone vs UTI vs pyelo.  Low concern for acute intra-abdominal pathology.  Will obtain basics, u/a w/ culture, CT abd/pelvis non-con, administer fluids, analgesics, anti-emetics, reassess, and dispo pending results.

## 2021-11-03 NOTE — ED PROVIDER NOTE - PROGRESS NOTE DETAILS
[place]: Pt resting comfortably. Feeling better. Results discussed with pt. [facility]  The patient was given verbal and written discharge instructions. Specifically, instructions when to return to the ED and when to seek follow-up from their pcp was discussed. Any specialty follow-up was discussed, including how to make an appointment.  Any necessary referral lists provided.  Instructions were discussed in simple, plain language and was understood by the patient. The patient understands that should their symptoms worsen or any new symptoms arise, they should return to the ED immediately for further evaluation. All pt's questions were answered. Patient verbalizes understanding.

## 2021-11-03 NOTE — ED ADULT NURSE NOTE - OBJECTIVE STATEMENT
Pt received in room 22. Pt A&Ox4, pmhx Prostate CA s/p radiation tx, HTN, c/o left sided flank pain that woke him up from his sleep at 3am. Pt states he also had nausea and vomiting with the pain. Pt appears uncomfortable. Pt denies cp, sob, abd pain, ha, dizziness, diarrhea, fevers/chills. Pt denies dysuria, urinary frequency or hematuria. Respirations even and unlabored, no accessory muscle use. 20G placed to L AC, labs sent. Stretcher in lowest position, side rails up, call bell within reach.

## 2021-11-03 NOTE — ED PROVIDER NOTE - NS ED ROS FT
General: denies fever, chills, weight loss/weight gain.  HENT: denies nasal congestion, sore throat, rhinorrhea, ear pain.  Eyes: denies visual changes, blurred vision, eye discharge, eye redness.  Neck: denies neck pain, neck swelling.  CV: denies chest pain, palpitations.  Resp: denies difficulty breathing, cough.  Abdominal: +nausea, vomiting, left flank pain.  MSK: denies muscle aches, bony pain, leg pain, leg swelling  Neuro: denies headaches, numbness, tingling, dizziness, lightheadedness.  Skin: denies rashes, cuts, bruises.  Hematologic: denies unexplained bruises.

## 2021-11-03 NOTE — ED ADULT TRIAGE NOTE - CHIEF COMPLAINT QUOTE
pt c/o L flank pain with n/v that woke home from sleep x2 hrs. Pt actively vomiting in triage appears uncomfortable. Denies urinary symptoms pt c/o L flank pain with n/v that woke him from sleep x2 hrs. Pt actively vomiting in triage appears uncomfortable. Denies urinary symptoms

## 2021-11-03 NOTE — ED ADULT NURSE NOTE - CHIEF COMPLAINT QUOTE
pt c/o L flank pain with n/v that woke him from sleep x2 hrs. Pt actively vomiting in triage appears uncomfortable. Denies urinary symptoms

## 2021-11-03 NOTE — ED PROVIDER NOTE - PATIENT PORTAL LINK FT
You can access the FollowMyHealth Patient Portal offered by [facility] by registering at the following website: [email or web address]. By joining [facility]’s FollowMyHealth portal, you will also be able to view your health information using other applications (apps) compatible with our system.

## 2021-11-03 NOTE — ED ADULT NURSE REASSESSMENT NOTE - NS ED NURSE REASSESS COMMENT FT1
Report received from night shift RNJesus. Pt in [facility], waiting for registration.

## 2021-11-03 NOTE — ED PROVIDER NOTE - ATTENDING CONTRIBUTION TO CARE
Afebrile. Awake and Alert. Lungs CTA. Heart RRR. Abdomen soft NTND. CN II-XII grossly intact. Moves all extremities without lateralization.    r/o ureterolithiasis

## 2021-11-03 NOTE — ED PROVIDER NOTE - OBJECTIVE STATEMENT
71M w/ PMHx of prostate cancer s/p radiation tx, HTN p/w left flank pain that woke him up from sleep at 3am.  A/w n/v.  Denies f/c, sick contacts, CP, SOB.  Denies hx of kidney stones, +FHx of kidney stones in sister.  Denies any dysuria, urinary frequency, hematuria.

## 2021-11-03 NOTE — ED PROVIDER NOTE - PHYSICAL EXAMINATION
GENERAL: Patient awake alert, in moderate distress.  HEENT: NC/AT, Moist mucous membranes.  LUNGS: CTAB, no wheezes or crackles.  CARDIAC: RRR, no m/r/g.  ABDOMEN: Soft, NT, ND, No rebound, guarding. Left CVA tenderness.   EXT: No edema. No calf tenderness.  MSK: No pain with movement, no deformities.  NEURO: A&Ox3. Moving all extremities.  SKIN: Warm and dry. No rash.  PSYCH: Normal affect.

## 2021-11-04 LAB
CULTURE RESULTS: NO GROWTH — SIGNIFICANT CHANGE UP
SPECIMEN SOURCE: SIGNIFICANT CHANGE UP

## 2021-12-04 ENCOUNTER — TRANSCRIPTION ENCOUNTER (OUTPATIENT)
Age: 71
End: 2021-12-04

## 2021-12-30 PROBLEM — I10 ESSENTIAL (PRIMARY) HYPERTENSION: Chronic | Status: ACTIVE | Noted: 2021-01-08

## 2022-01-07 ENCOUNTER — TRANSCRIPTION ENCOUNTER (OUTPATIENT)
Age: 72
End: 2022-01-07

## 2022-10-06 ENCOUNTER — EMERGENCY (EMERGENCY)
Facility: HOSPITAL | Age: 72
LOS: 0 days | Discharge: HOME | End: 2022-10-06
Attending: STUDENT IN AN ORGANIZED HEALTH CARE EDUCATION/TRAINING PROGRAM

## 2022-10-06 VITALS
DIASTOLIC BLOOD PRESSURE: 83 MMHG | TEMPERATURE: 98 F | RESPIRATION RATE: 16 BRPM | HEART RATE: 82 BPM | OXYGEN SATURATION: 97 % | SYSTOLIC BLOOD PRESSURE: 133 MMHG

## 2022-10-06 VITALS
RESPIRATION RATE: 16 BRPM | HEART RATE: 81 BPM | TEMPERATURE: 100 F | OXYGEN SATURATION: 96 % | DIASTOLIC BLOOD PRESSURE: 75 MMHG | HEIGHT: 67 IN | SYSTOLIC BLOOD PRESSURE: 126 MMHG | WEIGHT: 205.03 LBS

## 2022-10-06 DIAGNOSIS — C61 MALIGNANT NEOPLASM OF PROSTATE: ICD-10-CM

## 2022-10-06 DIAGNOSIS — Z87.442 PERSONAL HISTORY OF URINARY CALCULI: ICD-10-CM

## 2022-10-06 DIAGNOSIS — R10.11 RIGHT UPPER QUADRANT PAIN: ICD-10-CM

## 2022-10-06 DIAGNOSIS — I10 ESSENTIAL (PRIMARY) HYPERTENSION: ICD-10-CM

## 2022-10-06 DIAGNOSIS — Z88.8 ALLERGY STATUS TO OTHER DRUGS, MEDICAMENTS AND BIOLOGICAL SUBSTANCES STATUS: ICD-10-CM

## 2022-10-06 LAB
ALBUMIN SERPL ELPH-MCNC: 3.9 G/DL — SIGNIFICANT CHANGE UP (ref 3.3–5)
ALP SERPL-CCNC: 78 U/L — SIGNIFICANT CHANGE UP (ref 40–120)
ALT FLD-CCNC: 20 U/L — SIGNIFICANT CHANGE UP (ref 12–78)
ANION GAP SERPL CALC-SCNC: 7 MMOL/L — SIGNIFICANT CHANGE UP (ref 5–17)
APPEARANCE UR: CLEAR — SIGNIFICANT CHANGE UP
AST SERPL-CCNC: 19 U/L — SIGNIFICANT CHANGE UP (ref 15–37)
BACTERIA # UR AUTO: ABNORMAL
BASOPHILS # BLD AUTO: 0.03 K/UL — SIGNIFICANT CHANGE UP (ref 0–0.2)
BASOPHILS NFR BLD AUTO: 0.5 % — SIGNIFICANT CHANGE UP (ref 0–2)
BILIRUB SERPL-MCNC: 0.5 MG/DL — SIGNIFICANT CHANGE UP (ref 0.2–1.2)
BILIRUB UR-MCNC: NEGATIVE — SIGNIFICANT CHANGE UP
BUN SERPL-MCNC: 10 MG/DL — SIGNIFICANT CHANGE UP (ref 7–23)
CALCIUM SERPL-MCNC: 10 MG/DL — SIGNIFICANT CHANGE UP (ref 8.5–10.1)
CHLORIDE SERPL-SCNC: 107 MMOL/L — SIGNIFICANT CHANGE UP (ref 96–108)
CO2 SERPL-SCNC: 27 MMOL/L — SIGNIFICANT CHANGE UP (ref 22–31)
COLOR SPEC: YELLOW — SIGNIFICANT CHANGE UP
CREAT SERPL-MCNC: 1.14 MG/DL — SIGNIFICANT CHANGE UP (ref 0.5–1.3)
DIFF PNL FLD: NEGATIVE — SIGNIFICANT CHANGE UP
EGFR: 69 ML/MIN/1.73M2 — SIGNIFICANT CHANGE UP
EOSINOPHIL # BLD AUTO: 0 K/UL — SIGNIFICANT CHANGE UP (ref 0–0.5)
EOSINOPHIL NFR BLD AUTO: 0 % — SIGNIFICANT CHANGE UP (ref 0–6)
GLUCOSE SERPL-MCNC: 93 MG/DL — SIGNIFICANT CHANGE UP (ref 70–99)
GLUCOSE UR QL: NEGATIVE MG/DL — SIGNIFICANT CHANGE UP
HCT VFR BLD CALC: 42.2 % — SIGNIFICANT CHANGE UP (ref 39–50)
HGB BLD-MCNC: 13.8 G/DL — SIGNIFICANT CHANGE UP (ref 13–17)
IMM GRANULOCYTES NFR BLD AUTO: 0.3 % — SIGNIFICANT CHANGE UP (ref 0–0.9)
KETONES UR-MCNC: NEGATIVE — SIGNIFICANT CHANGE UP
LACTATE SERPL-SCNC: 1.1 MMOL/L — SIGNIFICANT CHANGE UP (ref 0.7–2)
LACTATE SERPL-SCNC: 2.2 MMOL/L — HIGH (ref 0.7–2)
LEUKOCYTE ESTERASE UR-ACNC: NEGATIVE — SIGNIFICANT CHANGE UP
LYMPHOCYTES # BLD AUTO: 1.27 K/UL — SIGNIFICANT CHANGE UP (ref 1–3.3)
LYMPHOCYTES # BLD AUTO: 21.1 % — SIGNIFICANT CHANGE UP (ref 13–44)
MCHC RBC-ENTMCNC: 26.7 PG — LOW (ref 27–34)
MCHC RBC-ENTMCNC: 32.7 G/DL — SIGNIFICANT CHANGE UP (ref 32–36)
MCV RBC AUTO: 81.6 FL — SIGNIFICANT CHANGE UP (ref 80–100)
MONOCYTES # BLD AUTO: 0.45 K/UL — SIGNIFICANT CHANGE UP (ref 0–0.9)
MONOCYTES NFR BLD AUTO: 7.5 % — SIGNIFICANT CHANGE UP (ref 2–14)
NEUTROPHILS # BLD AUTO: 4.25 K/UL — SIGNIFICANT CHANGE UP (ref 1.8–7.4)
NEUTROPHILS NFR BLD AUTO: 70.6 % — SIGNIFICANT CHANGE UP (ref 43–77)
NITRITE UR-MCNC: NEGATIVE — SIGNIFICANT CHANGE UP
NRBC # BLD: 0 /100 WBCS — SIGNIFICANT CHANGE UP (ref 0–0)
PH UR: 8 — SIGNIFICANT CHANGE UP (ref 5–8)
PLATELET # BLD AUTO: 229 K/UL — SIGNIFICANT CHANGE UP (ref 150–400)
POTASSIUM SERPL-MCNC: 4.4 MMOL/L — SIGNIFICANT CHANGE UP (ref 3.5–5.3)
POTASSIUM SERPL-SCNC: 4.4 MMOL/L — SIGNIFICANT CHANGE UP (ref 3.5–5.3)
PROT SERPL-MCNC: 7.3 GM/DL — SIGNIFICANT CHANGE UP (ref 6–8.3)
PROT UR-MCNC: 15 MG/DL
RBC # BLD: 5.17 M/UL — SIGNIFICANT CHANGE UP (ref 4.2–5.8)
RBC # FLD: 16.7 % — HIGH (ref 10.3–14.5)
RBC CASTS # UR COMP ASSIST: NEGATIVE /HPF — SIGNIFICANT CHANGE UP (ref 0–4)
SODIUM SERPL-SCNC: 141 MMOL/L — SIGNIFICANT CHANGE UP (ref 135–145)
SP GR SPEC: 1.01 — SIGNIFICANT CHANGE UP (ref 1.01–1.02)
UROBILINOGEN FLD QL: NEGATIVE MG/DL — SIGNIFICANT CHANGE UP
WBC # BLD: 6.02 K/UL — SIGNIFICANT CHANGE UP (ref 3.8–10.5)
WBC # FLD AUTO: 6.02 K/UL — SIGNIFICANT CHANGE UP (ref 3.8–10.5)
WBC UR QL: NEGATIVE — SIGNIFICANT CHANGE UP

## 2022-10-06 PROCEDURE — 99284 EMERGENCY DEPT VISIT MOD MDM: CPT

## 2022-10-06 PROCEDURE — 74176 CT ABD & PELVIS W/O CONTRAST: CPT | Mod: 26,MA

## 2022-10-06 RX ORDER — KETOROLAC TROMETHAMINE 30 MG/ML
30 SYRINGE (ML) INJECTION ONCE
Refills: 0 | Status: DISCONTINUED | OUTPATIENT
Start: 2022-10-06 | End: 2022-10-06

## 2022-10-06 RX ORDER — SODIUM CHLORIDE 9 MG/ML
1000 INJECTION INTRAMUSCULAR; INTRAVENOUS; SUBCUTANEOUS ONCE
Refills: 0 | Status: COMPLETED | OUTPATIENT
Start: 2022-10-06 | End: 2022-10-06

## 2022-10-06 RX ORDER — ONDANSETRON 8 MG/1
4 TABLET, FILM COATED ORAL ONCE
Refills: 0 | Status: COMPLETED | OUTPATIENT
Start: 2022-10-06 | End: 2022-10-06

## 2022-10-06 RX ORDER — LOSARTAN POTASSIUM 100 MG/1
1 TABLET, FILM COATED ORAL
Qty: 0 | Refills: 0 | DISCHARGE

## 2022-10-06 RX ADMIN — ONDANSETRON 4 MILLIGRAM(S): 8 TABLET, FILM COATED ORAL at 13:58

## 2022-10-06 RX ADMIN — SODIUM CHLORIDE 1000 MILLILITER(S): 9 INJECTION INTRAMUSCULAR; INTRAVENOUS; SUBCUTANEOUS at 14:02

## 2022-10-06 RX ADMIN — Medication 30 MILLIGRAM(S): at 13:58

## 2022-10-06 NOTE — ED PROVIDER NOTE - ENMT, MLM
Airway patent Island Pedicle Flap-Requiring Vessel Identification Text: The defect edges were debeveled with a #15 scalpel blade.  Given the location of the defect, shape of the defect and the proximity to free margins an island pedicle advancement flap was deemed most appropriate.  Using a sterile surgical marker, an appropriate advancement flap was drawn, based on the axial vessel mentioned above, incorporating the defect, outlining the appropriate donor tissue and placing the expected incisions within the relaxed skin tension lines where possible.    The area thus outlined was incised deep to adipose tissue with a #15 scalpel blade.  The skin margins were undermined to an appropriate distance in all directions around the primary defect and laterally outward around the island pedicle utilizing iris scissors.  There was minimal undermining beneath the pedicle flap.

## 2022-10-06 NOTE — ED ADULT TRIAGE NOTE - CHIEF COMPLAINT QUOTE
as per patient c/o R flank pain x 5 days.   Denies Injury, frequency, hematuria, and Dysuria.  hx: Prostate Cancer in remission, HTN

## 2022-10-06 NOTE — ED PROVIDER NOTE - PATIENT PORTAL LINK FT
You can access the FollowMyHealth Patient Portal offered by [facility] by registering at the following website: [email or web address]. By joining [facility]’s FollowMyHealth portal, you will also be able to view your health information using other applications (apps) compatible with our system.

## 2022-10-06 NOTE — ED PROVIDER NOTE - OBJECTIVE STATEMENT
right flank pain since sunday, constant, waxing and waning, aggravated at times w movment, has h./o kidney stones in the past  no urinary symptoms 72 year old male with h/o HTN and BPH presents today c/o right flank pain since Sunday, pt describes a constant, waxing and waning, aggravated at times w movement, has h./o kidney stones in the past, denies urinary symptoms, fevers or chills

## 2022-10-07 LAB
CULTURE RESULTS: SIGNIFICANT CHANGE UP
SPECIMEN SOURCE: SIGNIFICANT CHANGE UP

## 2022-11-30 ENCOUNTER — NON-APPOINTMENT (OUTPATIENT)
Age: 72
End: 2022-11-30

## 2024-08-07 PROCEDURE — 90833 PSYTX W PT W E/M 30 MIN: CPT

## 2025-02-07 ENCOUNTER — APPOINTMENT (OUTPATIENT)
Dept: PULMONOLOGY | Facility: CLINIC | Age: 75
End: 2025-02-07
Payer: MEDICARE

## 2025-02-07 VITALS
DIASTOLIC BLOOD PRESSURE: 74 MMHG | BODY MASS INDEX: 33.59 KG/M2 | HEART RATE: 81 BPM | RESPIRATION RATE: 15 BRPM | SYSTOLIC BLOOD PRESSURE: 131 MMHG | TEMPERATURE: 98 F | WEIGHT: 214 LBS | OXYGEN SATURATION: 95 % | HEIGHT: 67 IN

## 2025-02-07 DIAGNOSIS — G47.21 CIRCADIAN RHYTHM SLEEP DISORDER, DELAYED SLEEP PHASE TYPE: ICD-10-CM

## 2025-02-07 DIAGNOSIS — R06.83 SNORING: ICD-10-CM

## 2025-02-07 PROCEDURE — G2211 COMPLEX E/M VISIT ADD ON: CPT

## 2025-02-07 PROCEDURE — 99204 OFFICE O/P NEW MOD 45 MIN: CPT

## 2025-03-21 ENCOUNTER — APPOINTMENT (OUTPATIENT)
Dept: SLEEP CENTER | Facility: CLINIC | Age: 75
End: 2025-03-21

## 2025-03-21 ENCOUNTER — OUTPATIENT (OUTPATIENT)
Dept: OUTPATIENT SERVICES | Facility: HOSPITAL | Age: 75
LOS: 1 days | End: 2025-03-21
Payer: MEDICARE

## 2025-03-21 PROCEDURE — 95800 SLP STDY UNATTENDED: CPT | Mod: 26

## 2025-03-21 PROCEDURE — 95800 SLP STDY UNATTENDED: CPT

## 2025-03-26 DIAGNOSIS — G47.33 OBSTRUCTIVE SLEEP APNEA (ADULT) (PEDIATRIC): ICD-10-CM

## 2025-04-29 ENCOUNTER — APPOINTMENT (OUTPATIENT)
Dept: PULMONOLOGY | Facility: CLINIC | Age: 75
End: 2025-04-29
Payer: MEDICARE

## 2025-04-29 DIAGNOSIS — F51.04 PSYCHOPHYSIOLOGIC INSOMNIA: ICD-10-CM

## 2025-04-29 DIAGNOSIS — G47.33 OBSTRUCTIVE SLEEP APNEA (ADULT) (PEDIATRIC): ICD-10-CM

## 2025-04-29 DIAGNOSIS — G47.30 SLEEP APNEA, UNSPECIFIED: ICD-10-CM

## 2025-04-29 PROCEDURE — 99213 OFFICE O/P EST LOW 20 MIN: CPT | Mod: 2W

## 2025-04-29 PROCEDURE — G2211 COMPLEX E/M VISIT ADD ON: CPT | Mod: 2W

## 2025-04-29 RX ORDER — OLMESARTAN MEDOXOMIL 40 MG/1
TABLET, FILM COATED ORAL
Refills: 0 | Status: ACTIVE | COMMUNITY

## 2025-04-29 RX ORDER — OMEPRAZOLE 20 MG/1
TABLET, DELAYED RELEASE ORAL
Refills: 0 | Status: ACTIVE | COMMUNITY

## 2025-04-29 RX ORDER — ESCITALOPRAM OXALATE 20 MG/1
TABLET ORAL
Refills: 0 | Status: ACTIVE | COMMUNITY

## 2025-06-24 ENCOUNTER — APPOINTMENT (OUTPATIENT)
Dept: SLEEP CENTER | Facility: CLINIC | Age: 75
End: 2025-06-24

## 2025-06-24 ENCOUNTER — OUTPATIENT (OUTPATIENT)
Dept: OUTPATIENT SERVICES | Facility: HOSPITAL | Age: 75
LOS: 1 days | End: 2025-06-24
Payer: MEDICARE

## 2025-06-24 PROCEDURE — G0400: CPT | Mod: 26

## 2025-06-24 PROCEDURE — G0400: CPT

## 2025-07-03 DIAGNOSIS — G47.33 OBSTRUCTIVE SLEEP APNEA (ADULT) (PEDIATRIC): ICD-10-CM

## 2025-08-05 ENCOUNTER — APPOINTMENT (OUTPATIENT)
Dept: PULMONOLOGY | Facility: CLINIC | Age: 75
End: 2025-08-05
Payer: MEDICARE

## 2025-08-05 DIAGNOSIS — F51.04 PSYCHOPHYSIOLOGIC INSOMNIA: ICD-10-CM

## 2025-08-05 DIAGNOSIS — G47.33 OBSTRUCTIVE SLEEP APNEA (ADULT) (PEDIATRIC): ICD-10-CM

## 2025-08-05 DIAGNOSIS — G47.21 CIRCADIAN RHYTHM SLEEP DISORDER, DELAYED SLEEP PHASE TYPE: ICD-10-CM

## 2025-08-05 PROCEDURE — G2211 COMPLEX E/M VISIT ADD ON: CPT | Mod: 2W

## 2025-08-05 PROCEDURE — 99214 OFFICE O/P EST MOD 30 MIN: CPT | Mod: 2W
